# Patient Record
Sex: MALE | Race: WHITE
[De-identification: names, ages, dates, MRNs, and addresses within clinical notes are randomized per-mention and may not be internally consistent; named-entity substitution may affect disease eponyms.]

---

## 2017-07-07 ENCOUNTER — HOSPITAL ENCOUNTER (EMERGENCY)
Dept: HOSPITAL 60 - LB.ED | Age: 27
Discharge: HOME | End: 2017-07-07
Payer: SELF-PAY

## 2017-07-07 VITALS — SYSTOLIC BLOOD PRESSURE: 141 MMHG | DIASTOLIC BLOOD PRESSURE: 88 MMHG
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
## 2017-07-07 DIAGNOSIS — Z23: ICD-10-CM

## 2017-07-07 DIAGNOSIS — S61.001A: Primary | ICD-10-CM
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
## 2017-07-07 NOTE — ER
HISTORY OF PRESENT ILLNESS:  A 26-year-old male who comes in after cutting his right thumb.

The patient tells me he was using an instrument to slice onions at work when his thumb got

too close to the grate type instrument and it took a piece of skin off.  It did bleed a lot.

They had a hard time getting the bleeding to stop.  The cut happened about 2 hours ago,

maybe longer.  They were holding pressure on it, and finally, he thinks it may have stopped

bleeding, but he is unsure if anything further needs to be done.  The patient does not feel

lightheaded.  He states he feels fine.  He is unaware of his last tetanus shot.  He states

it was probably a long time ago.

 

CURRENT MEDICATIONS:  Albuterol p.r.n.

 

ALLERGIES:  NONE.

 

OBJECTIVE:  GENERAL APPEARANCE:  The patient is awake and alert, in no obvious respiratory

distress.

EXTREMITIES:  Examining the right thumb reveals multiple Band-Aids are applied to the end of

the thumb.  These were carefully removed revealing an avulsion of a piece of skin from the

tuft of the finger about 1 cm in diameter.  There is no active bleeding at this time.

 

TREATMENT PLAN:  The finger was swabbed with Betadine to the base of the finger after which

I injected 1% lidocaine in a digital block fashion.  I then cleansed the wound with a

Betadine/saline solution.  This did start the wound seeping again.  Pressure was held to the

base of the finger to reduce the blood flow after which I cleansed it one more time, applied

antibiotic ointment, and a Telfa pad, and covered this with tube gauze.  This did seem to

control the bleeding.  I held pressure to the area through the tube gauze dressing for a

couple of minutes and then had the patient continue to hold pressure on it directly over the

wound.  I advised him to do this until he gets back home.  A Tdap was given to the patient

tonight.  Post-care instructions; the patient is to keep the initial dressing on for up to 2

days as long as it is clean and not saturated or soiled.  He is then to change back to Band-

Aids applying 2 or 3 Band-Aids to cover the area, changing them every day or so.  He is to

monitor closely for infection.  Over-the-counter meds are to be used as needed for pain

control and he is going to need to keep the site covered for at least a week.  When he

starts to leave it open after 1 week when he is resting, he is to do it in a clean

environment for the first few days only.  The patient is going to need to wear rubber gloves

when he is at work until his condition is healed.  The patient has no further questions.

 

DIAGNOSIS:  Laceration to right thumb, 1 cm in length, avulsion type laceration in nature.

 

 

GIBRAN/NICHOLAS

DD:  07/07/2017 02:09:28

DT:  07/07/2017 02:23:20

Job #:  [id number]

## 2018-02-18 ENCOUNTER — HOSPITAL ENCOUNTER (EMERGENCY)
Dept: HOSPITAL 60 - LB.ED | Age: 28
Discharge: HOME | End: 2018-02-18
Payer: SELF-PAY

## 2018-02-18 DIAGNOSIS — J45.909: ICD-10-CM

## 2018-02-18 DIAGNOSIS — F17.210: ICD-10-CM

## 2018-02-18 DIAGNOSIS — F41.0: Primary | ICD-10-CM

## 2018-02-18 PROCEDURE — 85025 COMPLETE CBC W/AUTO DIFF WBC: CPT

## 2018-02-18 PROCEDURE — 99283 EMERGENCY DEPT VISIT LOW MDM: CPT

## 2018-02-18 PROCEDURE — 93005 ELECTROCARDIOGRAM TRACING: CPT

## 2018-02-18 PROCEDURE — 80053 COMPREHEN METABOLIC PANEL: CPT

## 2018-02-18 PROCEDURE — 36415 COLL VENOUS BLD VENIPUNCTURE: CPT

## 2018-02-18 NOTE — EDM.PDOC
ED HPI GENERAL MEDICAL PROBLEM





- General


Chief Complaint: General


Stated Complaint: ANXIETY


Time Seen by Provider: 02/18/18 11:20


Source of Information: Reports: Patient


History Limitations: Reports: No Limitations





- History of Present Illness


INITIAL COMMENTS - FREE TEXT/NARRATIVE: 


Pt claims that he has anxiety and he had an episode of feeling doomed, with 

shortness of breath,palpitations, chest tightness, and tingling in the arms 

last night which lasted for a while, and he was hyperventilating and anxious. 

Symptoms resolved by itself and he went to sleep. Pt had similar episodes 

started few minutes before he came into emergency room, he claims he felt his 

chest tightness, but no pain. No nausea or vomiting. He did uses breathing into 

paper bag. 





Pt claims he has normal stress of life and work, but nothing unusual. He did 

have panic attacks and anxiety and was on clonazepam for a long time, which 

helped and was stopped as he was not having anymore panic attacks. Pt claims he 

smokes cigarettes, but does not use any street drugs and he is not on any 

prescriptions drugs at this time.





No fever, chills, weakness. No vomiting. No cough. No other symptoms 





Onset: Today


Onset Date: 02/18/18


Onset Time: 11:00


Duration: Improving


Severity: Moderate


Improves with: Reports: None


Worsens with: Reports: None


Associated Symptoms: Denies: Confusion, Chest Pain, Cough, Diaphoresis, Fever/

Chills, Headaches, Malaise, Nausea/Vomiting, Rash, Seizure, Shortness of Breath

, Syncope, Weakness





- Related Data


 Allergies











Allergy/AdvReac Type Severity Reaction Status Date / Time


 


No Known Allergies Allergy   Verified 02/18/18 11:31











Home Meds: 


 Home Meds





Albuterol [Ventolin HFA] 1 puff INH TID PRN 02/18/18 [History]











Past Medical History


Respiratory History: Reports: Asthma


Musculoskeletal History: Reports: Other (See Below)


Other Musculoskeletal History: Broken left arm when he was 12 years old


Psychiatric History: Reports: Anxiety





Social & Family History





- Tobacco Use


Smoking Status *Q: Current Every Day Smoker


Years of Tobacco use: 8


Packs/Tins Daily: 1





- Alcohol Use


Days Per Week of Alcohol Use: 7


Number of Drinks Per Day: 2


Total Drinks Per Week: 14





- Recreational Drug Use


Recreational Drug Use: No





ED ROS GENERAL





- Review of Systems


Review Of Systems: See Below


Constitutional: Denies: Fever, Chills, Malaise, Weakness


HEENT: Denies: Rhinitis, Throat Pain, Throat Swelling, Vertigo, Vision Change


Respiratory: Reports: Shortness of Breath.  Denies: Wheezing, Pleuritic Chest 

Pain, Cough, Sputum


Cardiovascular: Denies: Chest Pain, Lightheadedness


GI/Abdominal: Reports: Nausea.  Denies: Abdominal Pain, Constipation, Diarrhea, 

Vomiting


: Denies: Dysuria, Urgency


Musculoskeletal: Denies: Joint Pain, Joint Swelling


Skin: Denies: Pruritis, Rash


Neurological: Reports: Dizziness.  Denies: Confusion, Headache, Numbness, 

Seizure, Syncope, Tingling, Trouble Speaking, Weakness


Psychiatric: Reports: Anxiety.  Denies: Agitation, Confusion, Cravings, 

Depression





ED EXAM, GENERAL





- Physical Exam


Exam: See Below


Exam Limited By: No Limitations


General Appearance: Alert, WD/WN, No Apparent Distress, Anxious


Eye Exam: Bilateral Eye: EOMI, PERRL


Ears: Normal External Exam, Normal Canal, Hearing Grossly Normal, Normal TMs


Ear Exam: Bilateral Ear: Auricle Normal, Canal Normal, TM normal


Nose: Normal Inspection, Normal Mucosa, No Blood


Throat/Mouth: Normal Inspection, Normal Lips, Normal Teeth, Normal Gums, Normal 

Oropharynx, Normal Voice, No Airway Compromise


Head: Atraumatic, Normocephalic


Neck: Normal Inspection, Supple, Non-Tender, Full Range of Motion


Respiratory/Chest: No Respiratory Distress, Lungs Clear, Normal Breath Sounds, 

No Accessory Muscle Use, Chest Non-Tender


Cardiovascular: Normal Peripheral Pulses, Regular Rate, Rhythm, No Edema, No 

Gallop, No JVD, No Murmur, No Rub


Peripheral Pulses: 2+: Brachial (L), Brachial (R), Radial (L), Radial (R)


GI/Abdominal: Normal Bowel Sounds, Soft, Non-Tender, No Organomegaly, No 

Distention, No Abnormal Bruit, No Mass


Extremities: Normal Inspection, Normal Range of Motion, Non-Tender, Normal 

Capillary Refill, No Pedal Edema


Neurological: Alert, Oriented, CN II-XII Intact, Normal Cognition, Normal Gait, 

Normal Reflexes, No Motor/Sensory Deficits


Psychiatric: Anxious





EKG INTERPRETATION


EKG Date: 02/18/18


Rhythm: NSR


Axis: Normal


P-Wave: Present


QRS: Normal


ST-T: Normal


QT: Normal





Course





- Vital Signs


Text/Narrative:: 


Pt's EKG is in normal sinus rhythm. His CBC and CMP are normal. Pt presents 

with typical panic attacks. It has resolved today with paper bag breathing. As 

he is starting to have recurrent episodes, I have counselled patient on 

medication therapy. I have recommended patient to start lexapro 10mg daily in 

the morning. Side effects discussed. If he does get panic attack symptoms 

started paper bag breathing immediately.





Followup in clinic for followup.








- Orders/Labs/Meds


Orders: 


 Active Orders 24 hr











 Category Date Time Status


 


 EKG Documentation Completion [RC] ASDIRECTED Care  02/18/18 11:34 Active











Labs: 


 Laboratory Tests











  02/18/18 02/18/18 Range/Units





  12:15 12:15 


 


WBC  15.4 H D   (4.0-11.0)  K/uL


 


RBC  4.68   (4.50-6.50)  M/uL


 


Hgb  15.7   (13.0-18.0)  g/dL


 


Hct  44.4   (40.0-54.0)  %


 


MCV  95   (76-96)  fL


 


MCH  33.5 H   (27.0-32.0)  pg


 


MCHC  35.4 H   (31.0-35.0)  g/dL


 


RDW  13.4   (11.0-16.0)  %


 


Plt Count  276  D   (150-400)  K/uL


 


MPV  10.2 H   (6.0-10.0)  fL


 


Neut % (Auto)  73.6 H   (45.0-70.0)  %


 


Lymph % (Auto)  18.1 L   (20.0-40.0)  %


 


Mono % (Auto)  6.2   (3.0-10.0)  %


 


Eos % (Auto)  1.8   (1.0-5.0)  %


 


Baso % (Auto)  0.3   (0.0-0.5)  %


 


Neut # (Auto)  11.33 H   (2.00-7.50)  K/uL


 


Lymph # (Auto)  2.78   (1.50-4.00)  K/uL


 


Mono # (Auto)  0.96 H   (0.20-0.80)  K/uL


 


Eos # (Auto)  0.27   (0.04-0.40)  K/uL


 


Baso # (Auto)  0.04   (0.02-0.10)  K/uL


 


Sodium   145  (136-145)  mmol/L


 


Potassium   3.7  (3.5-5.1)  mmol/L


 


Chloride   106  ()  mmol/L


 


Carbon Dioxide   23.7  (21.0-32.0)  mmol/L


 


Anion Gap   19.0 H  (5.0-15.0)  mmol/L


 


BUN   11  D  (8-26)  mg/dL


 


Creatinine   0.93  (0.70-1.30)  mg/dL


 


Est Cr Clr Drug Dosing   TNP  


 


Estimated GFR (MDRD)   > 60  (>60)  MLS/MIN


 


BUN/Creatinine Ratio   11.8  (6-25)  


 


Glucose   118 H  ()  mg/dL


 


Calcium   8.7  (8.5-10.1)  mg/dL


 


Total Bilirubin   0.5  (0.0-1.0)  mg/dL


 


AST   22  (15-37)  U/L


 


ALT   28  (12-78)  U/L


 


Alkaline Phosphatase   103  ()  U/L


 


Total Protein   7.3  (6.4-8.2)  g/dL


 


Albumin   4.1  (3.4-5.0)  g/dL


 


Globulin   3.2  (2.2-4.2)  g/dL


 


Albumin/Globulin Ratio   1.3  (0.8-2.0)  











Meds: 


Medications














Discontinued Medications














Generic Name Dose Route Start Last Admin





  Trade Name Amalia  PRN Reason Stop Dose Admin


 


Escitalopram Oxalate  Confirm  02/18/18 12:40  





  Lexapro  Administered  02/18/18 12:41  





  Dose   





  20 mg   





  .ROUTE   





  .STK-MED ONE   














Departure





- Departure


Time of Disposition: 12:45


Disposition: Home, Self-Care 01


Condition: Good


Clinical Impression: 


 Panic anxiety syndrome








- Discharge Information


Forms:  ED Department Discharge





- Problem List & Annotations


(1) Panic anxiety syndrome


SNOMED Code(s): 805816977


   Code(s): F41.0 - PANIC DISORDER [EPISODIC PAROXYSMAL ANXIETY]   Status: 

Acute   Current Visit: Yes   





- Problem List Review


Problem List Initiated/Reviewed/Updated: Yes





- My Orders


Last 24 Hours: 


My Active Orders





02/18/18 11:34


EKG Documentation Completion [RC] ASDIRECTED 














- Assessment/Plan


Last 24 Hours: 


My Active Orders





02/18/18 11:34


EKG Documentation Completion [RC] ASDIRECTED 











Assessment:: 


Anxiety with panic attack





Plan: 


Pt's EKG is in normal sinus rhythm. His CBC and CMP are normal. Pt presents 

with typical panic attacks. It has resolved today with paper bag breathing. As 

he is starting to have recurrent episodes, I have counselled patient on 

medication therapy. I have recommended patient to start lexapro 10mg daily in 

the morning. Side effects discussed. If he does get panic attack symptoms 

started paper bag breathing immediately.





Followup in clinic for followup.

## 2021-01-01 ENCOUNTER — HOSPITAL ENCOUNTER (EMERGENCY)
Dept: HOSPITAL 60 - LB.ED | Age: 31
Discharge: HOME | End: 2021-01-01
Payer: SELF-PAY

## 2021-01-01 VITALS — DIASTOLIC BLOOD PRESSURE: 91 MMHG | SYSTOLIC BLOOD PRESSURE: 132 MMHG | HEART RATE: 97 BPM

## 2021-01-01 DIAGNOSIS — F41.9: Primary | ICD-10-CM

## 2021-01-01 DIAGNOSIS — M79.605: ICD-10-CM

## 2021-01-01 DIAGNOSIS — F19.10: ICD-10-CM

## 2021-01-01 DIAGNOSIS — M79.604: ICD-10-CM

## 2021-01-02 NOTE — CR
DATE OF SERVICE:  01/01/2021

CLINICAL DATA:  Leg pain.



RIGHT FEMUR:



No acute fracture or dislocation.  No lytic or blastic bone lesions.  



LEFT FEMUR:



No acute fracture or dislocation.  No lytic or blastic bone lesions.



[id number]

[facility]

## 2021-01-02 NOTE — CR
DATE OF SERVICE:  01/01/2021

CLINICAL DATA:  Leg Pain



LEFT LOWER LEG:



No acute fracture or dislocation.  No lytic or blastic bone lesions.



RIGHT LOWER LEG:



No acute fracture or dislocation.  No lytic or blastic bone lesions.



[id number]

[facility]

## 2021-01-02 NOTE — CR
DATE OF SERVICE:  01/01/2021

CLINICAL DATA:  Leg Pain



AP PELVIS:



No acute fracture or dislocation.  No lytic or blastic bone lesions.  



[id number]

[facility]

## 2021-01-02 NOTE — ER
REASON FOR EMERGENCY ROOM VISIT:  Bilateral leg pain and anxiety.

 

HISTORY:  This 30-year-old man has a history of anxiety with panic attacks for 
which he has

been seen here in the past.  He states that he has had anxiety for the last 
month or so

which is marked by periods of hyperventilation and restlessness.  Approximately 
1 week ago,

he states he fell down the stairs and suffered some pain in both legs from his 
buttocks to

his toes, all the way around bilaterally circumferentially.  No particular 
specific movement

seems to hurt his pain.  He states that his pain is worse when he gets periods 
of anxiety.

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  As
mentioned

above, his anxiety and what he describes as panic attacks have increased 
recently over the

past month.  He does state he has some clonazepam at home that he has used from 
time to

time.  Initially, he denied the use of any illicit drugs.  However, on later 
questioning, he

did admit that he takes oxycodone.  He does admit to using marijuana as well.  I
note that

on past visits, he denied the use of any illicit drugs.  In the past, when he 
had the

episodes of hyperventilation associated with anxiety episodes or panic attacks, 
he states

that his symptoms have been improved to significant extent with the use of a 
paper bag that

was recommended to him by another provider.  The last time he tried this was 3 
days ago and

he stated it did help some.

 

SOCIAL HISTORY:  He is a cook as mentioned above and he lives with his 
girlfriend.

 

PAST MEDICAL HISTORY:  Significant for:

1. Anxiety as mentioned above with panic attacks.

2. Possible drug abuse.

3. He does have a history of asthma, which has not been active lately.

 

FAMILY HISTORY:  Unremarkable.

 

REVIEW OF SYSTEMS:

Pertinent positives and negatives as listed in the HPI.

 

PHYSICAL EXAMINATION:

GENERAL:  Upon admission, he tended to hyperventilate and moan a great deal 
complaining of

pain.  On questioning, his mood went from one of agony and pain to jocularity 
when

conversing.  His affect was definitely inappropriate and inconsistent throughout
my

interview with him.  VITAL SIGNS:  His heart rate was initially as high as 130, 
then went

down into the lower 100s throughout his initial emergency room visit, and just 
prior to

discharge, his heart rate was 97.  He is afebrile.  Blood pressure 132/91, 
respiratory rate

18, O2 sats 97%.  HEENT:  He has very poor dentition.  No scleral icterus.  
NECK:  Supple.

No adenopathy.  CHEST:  Clear to auscultation.  No wheezes, rhonchi, or rales 
were noted.

CARDIAC:  Regular rate without murmur.  ABDOMEN:  Soft and nontender.  
Nondistended.  BACK:

Nontender to palpation or percussion.  Straight leg raising was negative.  
EXTREMITIES:

Both lower legs were carefully examined.  He has no point tenderness.  
Passively, I was able

to move all of his joints including his hips, knees, and ankles without too much
trouble;

however, intermittently he would express pain, but this was very inconsistent 
throughout my

exam.  There was no external evidence of trauma.  No bony crepitus or swelling 
was noted.

His distal pulses and perfusion are normal and his distal sensation is normal.

 

LABORATORY DATA:  He did give permission for a urine screen and this showed that
he was

positive for oxycodone and benzodiazepines, it was negative for marijuana and 
amphetamines

and all other drugs on the toxicology screen.

 

FURTHER EMERGENCY ROOM COURSE:  He was given lorazepam 1 mg IM x1 and this 
produced a

remarkable calming and relief of his anxiety to the point where he went to sleep
in the

emergency department.  We did go ahead and x-rayed both lower extremities from 
his pelvis

down to the ankles, at least 2 views were obtained to rule out any occult 
fracture and there

was no evidence of fracture or dislocation

 

IMPRESSION:

1. Anxiety.

2. History of recent illicit drug use.

3. Lower extremity pain, possibly related to muscle spasms during periods of

    hyperventilation.

 

PLAN:  I discussed with him the importance that he maintain a relationship with 
a provider

for his management of his anxiety and we did have a discussion about the risks 
inherent with

his continuing to use illicit drugs such as oxycodone that had not been 
prescribed to him.

He stated that he does not feel he has a problem with oxycodone and assured me 
he will not

use this drug in the future.  He states he does not use it to any degree of 
regularity and is not

interested in seeking out any treatment at this time.  He does have clonazepam 
at home and 

I told him that since this was prescribed to him, he can go ahead and use this 
as needed, but 

the important takeaway of this visit is that he needs to

be managed with a provider on a regular basis to get his anxiety issues under 
control.  He

understands and agrees with this.  All questions were answered.

 

 

STANISLAV/NICHOLAS

DD:  01/01/2021 23:01:59

DT:  01/02/2021 01:58:44

Job #:  [id number]

KARIE

## 2022-11-29 ENCOUNTER — HOSPITAL ENCOUNTER (EMERGENCY)
Dept: HOSPITAL 60 - LB.ED | Age: 32
Discharge: HOME | End: 2022-11-29
Payer: MEDICAID

## 2022-11-29 VITALS — HEART RATE: 115 BPM | SYSTOLIC BLOOD PRESSURE: 154 MMHG | DIASTOLIC BLOOD PRESSURE: 93 MMHG

## 2022-11-29 DIAGNOSIS — S69.92XA: Primary | ICD-10-CM
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
